# Patient Record
Sex: MALE | Race: WHITE | Employment: FULL TIME | URBAN - METROPOLITAN AREA
[De-identification: names, ages, dates, MRNs, and addresses within clinical notes are randomized per-mention and may not be internally consistent; named-entity substitution may affect disease eponyms.]

---

## 2017-03-13 NOTE — TELEPHONE ENCOUNTER
From: Melinda Light  To: Siddhartha Arias NP  Sent: 3/11/2017 3:36 PM EST  Subject: Medication Renewal Request    Original authorizing provider: ROSE White would like a refill of the following medications:  fenofibrate (LOFIBRA) 160 mg tablet Siddhartha Arias NP]    Preferred pharmacy: [facility]:

## 2017-03-14 RX ORDER — FENOFIBRATE 160 MG/1
TABLET ORAL
Qty: 90 TAB | Refills: 0 | Status: SHIPPED | OUTPATIENT
Start: 2017-03-14 | End: 2017-03-28 | Stop reason: SDUPTHER

## 2017-05-15 ENCOUNTER — OFFICE VISIT (OUTPATIENT)
Dept: FAMILY MEDICINE CLINIC | Age: 42
End: 2017-05-15

## 2017-05-15 VITALS
BODY MASS INDEX: 29.35 KG/M2 | SYSTOLIC BLOOD PRESSURE: 128 MMHG | OXYGEN SATURATION: 98 % | HEIGHT: 70 IN | WEIGHT: 205 LBS | DIASTOLIC BLOOD PRESSURE: 74 MMHG | TEMPERATURE: 98 F | RESPIRATION RATE: 16 BRPM | HEART RATE: 56 BPM

## 2017-05-15 DIAGNOSIS — Z82.49 FH: HEART DISEASE: ICD-10-CM

## 2017-05-15 DIAGNOSIS — G47.00 INSOMNIA, UNSPECIFIED TYPE: ICD-10-CM

## 2017-05-15 DIAGNOSIS — F41.8 DEPRESSION WITH ANXIETY: ICD-10-CM

## 2017-05-15 DIAGNOSIS — E78.2 MIXED DYSLIPIDEMIA: Primary | ICD-10-CM

## 2017-05-15 RX ORDER — VORTIOXETINE 20 MG/1
TABLET, FILM COATED ORAL
Refills: 0 | COMMUNITY
Start: 2017-04-11

## 2017-05-15 NOTE — PROGRESS NOTES
HISTORY OF PRESENT ILLNESS  Mary Huerta is a 42 y.o. male. HPI  Follow up chronic health problems. Mixed hyperlipidemia. Taking prescribed medications. Following healthy diet and exercising regularly. Positive FH CAD. Depression with anxiety. Followed by psych. Stable on trintellix. Insomnia. Still having some problems with falling asleep and staying asleep at night. Medications managed by psych. Taking gabapentin and ambien with little sx relief. Past Medical History:   Diagnosis Date    Hypercholesterolemia      Patient Active Problem List   Diagnosis Code    Hypertriglyceridemia E78.1    FH: heart disease Z82.49    Insomnia G47.00    Mixed dyslipidemia E78.2    Depression with anxiety F41.8     Current Outpatient Prescriptions   Medication Sig    TRINTELLIX 20 mg tablet TK 1 T PO QAM    fenofibrate (LOFIBRA) 160 mg tablet TAKE 1 TABLET BY MOUTH DAILY. Office visit due.  atorvastatin (LIPITOR) 10 mg tablet TAKE 1 TABLET DAILY    ketoconazole (NIZORAL) 2 % shampoo Apply twice weekly.  gabapentin (NEURONTIN) 300 mg capsule TAKE 1 CAPSULE BY MOUTH EVERY NIGHT AT BEDTIME    HYPODERMIC NEEDLES 18 gauge x 1 1/2\" ndle USE AS DIRECTED    BD LUER-JONATAN SYRINGE 3 mL 23 gauge x 1 1/2\" syrg USE AS DIRECTED    testosterone cypionate (DEPOTESTOTERONE CYPIONATE) 200 mg/mL injection INJECT 0.5 ML ITRAMUSCULARLY WEEKLY    ketoconazole (NIZORAL) 2 % topical cream Apply  to affected area two (2) times a day.  zolpidem (AMBIEN) 5 mg tablet Take 1 Tab by mouth nightly as needed.  desonide (DESOWEN) 0.05 % topical ointment Apply  to affected area two (2) times a day. No current facility-administered medications for this visit.       Social History   Substance Use Topics    Smoking status: Never Smoker    Smokeless tobacco: Never Used    Alcohol use 1.5 oz/week     3 Cans of beer per week      Comment: socially     Visit Vitals    /74 (BP 1 Location: Right arm, BP Patient Position: Sitting)    Pulse (!) 56    Temp 98 °F (36.7 °C) (Oral)    Resp 16    Ht 5' 10\" (1.778 m)    Wt 205 lb (93 kg)    SpO2 98%    BMI 29.41 kg/m2     Review of Systems   Constitutional: Negative. Eyes: Negative. Respiratory: Negative for cough and shortness of breath. Cardiovascular: Negative for chest pain, palpitations and leg swelling. Neurological: Negative for headaches. Psychiatric/Behavioral: Positive for depression (stable ). Negative for hallucinations, substance abuse and suicidal ideas. The patient has insomnia. The patient is not nervous/anxious. All other systems reviewed and are negative. Physical Exam   Constitutional: He appears well-developed and well-nourished. No distress. Neck: Neck supple. Cardiovascular: Normal rate, regular rhythm and normal heart sounds. Pulmonary/Chest: Effort normal and breath sounds normal.   Abdominal: Soft. He exhibits no distension. There is no tenderness. There is no guarding. Musculoskeletal: He exhibits no edema. Lymphadenopathy:     He has no cervical adenopathy. Neurological: He is alert. Skin: Skin is warm and dry. Psychiatric: He has a normal mood and affect. ASSESSMENT and PLAN  Nishi Nance was seen today for cholesterol problem and depression. Diagnoses and all orders for this visit:    Mixed dyslipidemia  -     TX HANDLG&/OR CONVEY OF SPEC FOR TR OFFICE TO LAB  -     TX COLLECTION VENOUS BLOOD,VENIPUNCTURE  -     LIPID PANEL  -     METABOLIC PANEL, COMPREHENSIVE  Reviewed healthy, AHA diet and exercise recommendations. Check fasting labs. FH: heart disease    Depression with anxiety  Symptoms stable on current treatment. Follow up as scheduled with psych. Insomnia, unspecified type  Sub optimal sx control. Recommend discuss with psych at next office visit. Follow up 6 months or sooner prn.

## 2017-05-15 NOTE — PROGRESS NOTES
Chief Complaint   Patient presents with    Cholesterol Problem     fasting     Depression       Reviewed Record in preparation for visit and have obtained necessary documentation. Identified pt with two pt identifiers (Name @ )    Health Maintenance Due   Topic    DTaP/Tdap/Td series (1 - Tdap)         1. Have you been to the ER, urgent care clinic since your last visit? Hospitalized since your last visit? No    2. Have you seen or consulted any other health care providers outside of the [address] [place] [address] since your last visit? Include any pap smears or colon screening. Saw [facility]. Recently.

## 2017-05-15 NOTE — MR AVS SNAPSHOT
Visit Information Date & Time Provider Department Dept. Phone Encounter #  
 5/15/2017  8:15 AM Dawson Alba, [address] [place] [address] [phone] [id number] Upcoming Health Maintenance Date Due DTaP/Tdap/Td series (1 - Tdap) 8/26/1995 INFLUENZA AGE 9 TO ADULT 8/1/2017 Allergies as of 5/15/2017  Review Complete On: 5/15/2017 By: Dawson Alba NP No Known Allergies Current Immunizations  Reviewed on 9/22/2016 Name Date Influenza Vaccine 9/20/2016, 9/17/2014 Not reviewed this visit You Were Diagnosed With   
  
 Codes Comments Mixed dyslipidemia    -  Primary ICD-10-CM: I35.4 ICD-9-CM: 272.2 FH: heart disease     ICD-10-CM: Z82.49 
ICD-9-CM: V17.49 Depression with anxiety     ICD-10-CM: F41.8 ICD-9-CM: 300.4 Insomnia, unspecified type     ICD-10-CM: G47.00 ICD-9-CM: 780.52 Vitals BP Pulse Temp Resp Height(growth percentile) Weight(growth percentile) 128/74 (BP 1 Location: Right arm, BP Patient Position: Sitting) (!) 56 98 °F (36.7 °C) (Oral) 16 5' 10\" (1.778 m) 205 lb (93 kg) SpO2 BMI Smoking Status 98% 29.41 kg/m2 Never Smoker Vitals History BMI and BSA Data Body Mass Index Body Surface Area  
 29.41 kg/m 2 2.14 m 2 Preferred Pharmacy Pharmacy Name Phone [facility], [facility] [phone] Your Updated Medication List  
  
   
This list is accurate as of: 5/15/17  8:43 AM.  Always use your most recent med list.  
  
  
  
  
 atorvastatin 10 mg tablet Commonly known as:  LIPITOR  
TAKE 1 TABLET DAILY  
  
 BD LUER-JONATAN SYRINGE 3 mL 23 gauge x 1 1/2\" Syrg Generic drug:  Syringe with Needle (Disp) USE AS DIRECTED  
  
 desonide 0.05 % topical ointment Commonly known as:  Nicholette Galindo Apply  to affected area two (2) times a day. fenofibrate 160 mg tablet Commonly known as:  LOFIBRA TAKE 1 TABLET BY MOUTH DAILY. Office visit due.  
  
 gabapentin 300 mg capsule Commonly known as:  NEURONTIN  
TAKE 1 CAPSULE BY MOUTH EVERY NIGHT AT BEDTIME  
  
 HYPODERMIC NEEDLES 18 gauge x 1 1/2\" Ndle Generic drug:  Needle (Disp) 18 G  
USE AS DIRECTED * ketoconazole 2 % topical cream  
Commonly known as:  NIZORAL Apply  to affected area two (2) times a day. * ketoconazole 2 % shampoo Commonly known as:  NIZORAL Apply twice weekly. testosterone cypionate 200 mg/mL injection Commonly known as:  DEPOTESTOTERONE CYPIONATE INJECT 0.5 ML ITRAMUSCULARLY WEEKLY  
  
 TRINTELLIX 20 mg tablet Generic drug:  vortioxetine TK 1 T PO QAM  
  
 zolpidem 5 mg tablet Commonly known as:  AMBIEN Take 1 Tab by mouth nightly as needed. * Notice: This list has 2 medication(s) that are the same as other medications prescribed for you. Read the directions carefully, and ask your doctor or other care provider to review them with you. We Performed the Following LIPID PANEL [85635 CPT(R)] METABOLIC PANEL, COMPREHENSIVE [91909 CPT(R)] SC COLLECTION VENOUS BLOOD,VENIPUNCTURE G5635232 CPT(R)] SC HANDLG&/OR CONVEY OF SPEC FOR TR OFFICE TO LAB [60437 CPT(R)] Introducing \[facility]\"" & HEALTH SERVICES! Dear Kole Woods: Thank you for requesting a [employer] account. Our records indicate that you already have an active [employer] account. You can access your account anytime at [email or web address] Did you know that you can access your hospital and ER discharge instructions at any time in [employer]? You can also review all of your test results from your hospital stay or ER visit. Additional Information If you have questions, please visit the Frequently Asked Questions section of the [employer] website at [email or web address] Remember, [employer] is NOT to be used for urgent needs. For medical emergencies, dial 911. Now available from your iPhone and Android! Please provide this summary of care documentation to your next provider. Your primary care clinician is listed as Andrew Plan. If you have any questions after today's visit, please call [phone].

## 2017-05-16 LAB
ALBUMIN SERPL-MCNC: 4.7 G/DL (ref 3.5–5.5)
ALBUMIN/GLOB SERPL: 2 {RATIO} (ref 1.2–2.2)
ALP SERPL-CCNC: 50 IU/L (ref 39–117)
ALT SERPL-CCNC: 28 IU/L (ref 0–44)
AST SERPL-CCNC: 59 IU/L (ref 0–40)
BILIRUB SERPL-MCNC: 0.5 MG/DL (ref 0–1.2)
BUN SERPL-MCNC: 14 MG/DL (ref 6–24)
BUN/CREAT SERPL: 12 (ref 9–20)
CALCIUM SERPL-MCNC: 9.7 MG/DL (ref 8.7–10.2)
CHLORIDE SERPL-SCNC: 104 MMOL/L (ref 96–106)
CHOLEST SERPL-MCNC: 187 MG/DL (ref 100–199)
CO2 SERPL-SCNC: 22 MMOL/L (ref 18–29)
CREAT SERPL-MCNC: 1.18 MG/DL (ref 0.76–1.27)
GLOBULIN SER CALC-MCNC: 2.3 G/DL (ref 1.5–4.5)
GLUCOSE SERPL-MCNC: 91 MG/DL (ref 65–99)
HDLC SERPL-MCNC: 57 MG/DL
INTERPRETATION, 910389: NORMAL
LDLC SERPL CALC-MCNC: 108 MG/DL (ref 0–99)
POTASSIUM SERPL-SCNC: 4.6 MMOL/L (ref 3.5–5.2)
PROT SERPL-MCNC: 7 G/DL (ref 6–8.5)
SODIUM SERPL-SCNC: 142 MMOL/L (ref 134–144)
TRIGL SERPL-MCNC: 111 MG/DL (ref 0–149)
VLDLC SERPL CALC-MCNC: 22 MG/DL (ref 5–40)

## 2017-05-22 DIAGNOSIS — F51.01 PRIMARY INSOMNIA: Primary | ICD-10-CM

## 2017-05-22 RX ORDER — ESZOPICLONE 2 MG/1
2 TABLET, FILM COATED ORAL
Qty: 30 TAB | Refills: 2 | Status: SHIPPED | OUTPATIENT
Start: 2017-05-22 | End: 2017-06-08 | Stop reason: ALTCHOICE

## 2017-06-08 DIAGNOSIS — G47.00 INSOMNIA, UNSPECIFIED TYPE: Primary | ICD-10-CM

## 2017-06-08 RX ORDER — ZOLPIDEM TARTRATE 10 MG/1
10 TABLET ORAL
Qty: 30 TAB | Refills: 2 | Status: SHIPPED | OUTPATIENT
Start: 2017-06-08 | End: 2017-08-14 | Stop reason: SDUPTHER

## 2017-08-12 DIAGNOSIS — G47.00 INSOMNIA, UNSPECIFIED TYPE: ICD-10-CM

## 2017-08-14 RX ORDER — ZOLPIDEM TARTRATE 10 MG/1
10 TABLET ORAL
Qty: 30 TAB | Refills: 2 | Status: SHIPPED | OUTPATIENT
Start: 2017-08-14 | End: 2017-11-12 | Stop reason: SDUPTHER

## 2017-08-14 NOTE — TELEPHONE ENCOUNTER
From: Pastor Shelley  To: Silvia Reed NP  Sent: 8/12/2017 11:13 AM EDT  Subject: Medication Renewal Request    Original authorizing provider: ROSE Paredes. Matt Restrepo would like a refill of the following medications:  zolpidem (AMBIEN) 10 mg tablet Silvia Reed NP]    Preferred pharmacy: Other - [facility] on [address]:  Could you send some refills for Ambien to the [facility] pharmacy at [address] [place] [address]? My prescriptions were at [facility] and I had to switch to [facility] because of my new insurance and now [facility] cannot get [facility] to transfer it there. Thank you.

## 2017-10-09 RX ORDER — FENOFIBRATE 160 MG/1
160 TABLET ORAL DAILY
Qty: 90 TAB | Refills: 1 | Status: SHIPPED | OUTPATIENT
Start: 2017-10-09 | End: 2018-05-06 | Stop reason: SDUPTHER

## 2017-10-09 NOTE — TELEPHONE ENCOUNTER
From: Adrian Baez  To: Balwinder Mena NP  Sent: 10/8/2017 6:29 PM EDT  Subject: Medication Renewal Request    Original authorizing provider: ROSE Vargas would like a refill of the following medications:  fenofibrate (LOFIBRA) 160 mg tablet [Venessa Wallace NP]    Preferred pharmacy: [address] [place] [address]    Comment:

## 2017-11-12 DIAGNOSIS — G47.00 INSOMNIA, UNSPECIFIED TYPE: ICD-10-CM

## 2017-11-13 RX ORDER — ZOLPIDEM TARTRATE 10 MG/1
10 TABLET ORAL
Qty: 30 TAB | Refills: 2 | Status: SHIPPED | OUTPATIENT
Start: 2017-11-13 | End: 2018-02-12 | Stop reason: SDUPTHER

## 2017-11-13 NOTE — TELEPHONE ENCOUNTER
From: Tello Mckeon  To: Simona Gitelman, NP  Sent: 11/12/2017 8:12 AM EST  Subject: Medication Renewal Request    Original authorizing provider: Simona Gitelman, NP Karma Hint.  Cristine Nuñez would like a refill of the following medications:  zolpidem (AMBIEN) 10 mg tablet [Venessa Wallace NP]    Preferred pharmacy: [facility]ACY #[id number], [address]:

## 2017-12-24 DIAGNOSIS — E78.2 MIXED DYSLIPIDEMIA: ICD-10-CM

## 2017-12-26 RX ORDER — ATORVASTATIN CALCIUM 10 MG/1
10 TABLET, FILM COATED ORAL DAILY
Qty: 90 TAB | Refills: 0 | Status: SHIPPED | OUTPATIENT
Start: 2017-12-26 | End: 2018-04-21 | Stop reason: SDUPTHER

## 2017-12-26 NOTE — TELEPHONE ENCOUNTER
From: Freddy Strickland  To: Montez Toledo NP  Sent: 12/24/2017 5:00 PM EST  Subject: Medication Renewal Request    Original authorizing provider: ROSE Alcantar.  Sarah Ariasterrie would like a refill of the following medications:  atorvastatin (LIPITOR) 10 mg tablet [Venessa Wallace NP]    Preferred pharmacy: [facility]/PHARMACY [address] [place] [address], [address]:

## 2018-02-19 ENCOUNTER — OFFICE VISIT (OUTPATIENT)
Dept: FAMILY MEDICINE CLINIC | Age: 43
End: 2018-02-19

## 2018-02-19 VITALS
OXYGEN SATURATION: 98 % | WEIGHT: 197 LBS | RESPIRATION RATE: 18 BRPM | TEMPERATURE: 98.4 F | SYSTOLIC BLOOD PRESSURE: 118 MMHG | DIASTOLIC BLOOD PRESSURE: 72 MMHG | HEART RATE: 65 BPM | HEIGHT: 70 IN | BODY MASS INDEX: 28.2 KG/M2

## 2018-02-19 DIAGNOSIS — F41.8 DEPRESSION WITH ANXIETY: ICD-10-CM

## 2018-02-19 DIAGNOSIS — E78.2 MIXED DYSLIPIDEMIA: Primary | ICD-10-CM

## 2018-02-19 DIAGNOSIS — F51.04 CHRONIC INSOMNIA: ICD-10-CM

## 2018-02-19 NOTE — PROGRESS NOTES
HISTORY OF PRESENT ILLNESS  Danielle Chávez is a 36 y.o. male. HPI  Follow up chronic health problems. Mixed hyperlipidemia. Taking prescribed meds. Following healthy diet and exercising regularly. Chronic insomnia. Stable on ambien. Has tried alternative therapy without success. Depression with anxiety. Followed by psych. Stable on meds. Past Medical History:   Diagnosis Date    Hypercholesterolemia      Patient Active Problem List   Diagnosis Code    Insomnia G47.00    Mixed dyslipidemia E78.2    Depression with anxiety F41.8     Current Outpatient Prescriptions   Medication Sig    zolpidem (AMBIEN) 10 mg tablet Take 1 Tab by mouth nightly as needed for Sleep. Max Daily Amount: 10 mg. Appointment due.  atorvastatin (LIPITOR) 10 mg tablet Take 1 Tab by mouth daily. Appointment due.  fenofibrate (LOFIBRA) 160 mg tablet Take 1 Tab by mouth daily.  TRINTELLIX 20 mg tablet TK 1 T PO QAM    gabapentin (NEURONTIN) 300 mg capsule TAKE 1 CAPSULE BY MOUTH EVERY NIGHT AT BEDTIME     No current facility-administered medications for this visit. Social History   Substance Use Topics    Smoking status: Never Smoker    Smokeless tobacco: Never Used    Alcohol use 1.5 oz/week     3 Cans of beer per week      Comment: socially     Visit Vitals    /72 (BP 1 Location: Left arm, BP Patient Position: Sitting)    Pulse 65    Temp 98.4 °F (36.9 °C) (Oral)    Resp 18    Ht 5' 10\" (1.778 m)    Wt 197 lb (89.4 kg)    SpO2 98%    BMI 28.27 kg/m2     Review of Systems   Constitutional: Negative. Respiratory: Negative for cough and shortness of breath. Cardiovascular: Negative for chest pain, palpitations and leg swelling. Psychiatric/Behavioral: Positive for depression (stable on meds). Negative for substance abuse and suicidal ideas. The patient has insomnia. The patient is not nervous/anxious. All other systems reviewed and are negative.       Physical Exam   Constitutional: No distress. Neck: Neck supple. Cardiovascular: Normal rate, regular rhythm and normal heart sounds. Pulmonary/Chest: Effort normal and breath sounds normal.   Abdominal: Soft. He exhibits no distension. There is no tenderness. There is no guarding. Musculoskeletal: He exhibits no edema. Lymphadenopathy:     He has no cervical adenopathy. Neurological: He is alert. Skin: Skin is warm and dry. Psychiatric: He has a normal mood and affect. His behavior is normal. Thought content normal.       ASSESSMENT and PLAN  Diagnoses and all orders for this visit:    1. Mixed dyslipidemia  -     LIPID PANEL  -     METABOLIC PANEL, COMPREHENSIVE  -     [place] HANDLG&/OR CONVEY OF SPEC FOR TR OFFICE TO LAB  -     COLLECTION VENOUS BLOOD,VENIPUNCTURE  Reviewed healthy diet and exercise recommendations. Fasting labs sent. 2. Depression with anxiety  Stable on current meds. Follow up as scheduled with psych. 3. Chronic insomnia  Stable on ambien. Medication risks for dependence,benefits and potential side effects were reviewed. Discussed trying belsomra. He will consider. Declined at this time. 4. BMI 28.0-28.9,adult  Weight loss recommendations given. Follow up 6 months or sooner prn.

## 2018-02-19 NOTE — MR AVS SNAPSHOT
[address] [place] [address] 
[phone] Patient: Emily Finn MRN: [id number] :[id number] Visit Information Date & Time Provider Department Dept. Phone Encounter #  
 2018  8:15 AM John Moon, [address] [place] [address] [phone] [id number] Upcoming Health Maintenance Date Due DTaP/Tdap/Td series (2 - Td) 5/15/2027 Allergies as of 2018  Review Complete On: 2018 By: John Moon NP No Known Allergies Current Immunizations  Reviewed on 2016 Name Date Influenza Vaccine 10/19/2017, 2016, 2014 Not reviewed this visit You Were Diagnosed With   
  
 Codes Comments Mixed dyslipidemia    -  Primary ICD-10-CM: O19.6 ICD-9-CM: 272.2 Depression with anxiety     ICD-10-CM: F41.8 ICD-9-CM: 300.4 Chronic insomnia     ICD-10-CM: F51.04 
ICD-9-CM: 780.52 Vitals BP Pulse Temp Resp Height(growth percentile) Weight(growth percentile) 118/72 (BP 1 Location: Left arm, BP Patient Position: Sitting) 65 98.4 °F (36.9 °C) (Oral) 18 5' 10\" (1.778 m) 197 lb (89.4 kg) SpO2 BMI Smoking Status 98% 28.27 kg/m2 Never Smoker BMI and BSA Data Body Mass Index Body Surface Area  
 28.27 kg/m 2 2.1 m 2 Preferred Pharmacy Pharmacy Name Phone [id number] [place], [address] [facility] [phone] Your Updated Medication List  
  
   
This list is accurate as of: 18  8:51 AM.  Always use your most recent med list.  
  
  
  
  
 atorvastatin 10 mg tablet Commonly known as:  LIPITOR Take 1 Tab by mouth daily. Appointment due. fenofibrate 160 mg tablet Commonly known as:  LOFIBRA Take 1 Tab by mouth daily. gabapentin 300 mg capsule Commonly known as:  NEURONTIN  
TAKE 1 CAPSULE BY MOUTH EVERY NIGHT AT BEDTIME  
  
 TRINTELLIX 20 mg tablet Generic drug:  vortioxetine TK 1 T PO QAM  
  
 zolpidem 10 mg tablet Commonly known as:  AMBIEN Take 1 Tab by mouth nightly as needed for Sleep. Max Daily Amount: 10 mg. Appointment due. We Performed the Following COLLECTION VENOUS BLOOD,VENIPUNCTURE [id number] CPT(R)] LIPID PANEL [65756 CPT(R)] METABOLIC PANEL, COMPREHENSIVE [71318 CPT(R)] [place] HANDLG&/OR CONVEY OF SPEC FOR TR OFFICE TO LAB [41652 CPT(R)] Introducing [facility] SERVICES! Dear Jaison Carey: Thank you for requesting a [employer] account. Our records indicate that you already have an active [employer] account. You can access your account anytime at [email or web address] Did you know that you can access your hospital and ER discharge instructions at any time in [employer]? You can also review all of your test results from your hospital stay or ER visit. Additional Information If you have questions, please visit the Frequently Asked Questions section of the [employer] website at [email or web address] Remember, [employer] is NOT to be used for urgent needs. For medical emergencies, dial 911. Now available from your iPhone and Android! Please provide this summary of care documentation to your next provider. Your primary care clinician is listed as Sven Wong. If you have any questions after today's visit, please call [phone].

## 2018-02-19 NOTE — PROGRESS NOTES
Chief Complaint   Patient presents with    Medication Evaluation    Cholesterol Problem     1. Have you been to the ER, urgent care clinic since your last visit? Hospitalized since your last visit? No    2. Have you seen or consulted any other health care providers outside of the [address] [place] [address] since your last visit? Include any pap smears or colon screening.  No

## 2018-02-20 LAB
ALBUMIN SERPL-MCNC: 4.7 G/DL (ref 3.5–5.5)
ALBUMIN/GLOB SERPL: 1.9 {RATIO} (ref 1.2–2.2)
ALP SERPL-CCNC: 45 IU/L (ref 39–117)
ALT SERPL-CCNC: 21 IU/L (ref 0–44)
AST SERPL-CCNC: 20 IU/L (ref 0–40)
BILIRUB SERPL-MCNC: 0.3 MG/DL (ref 0–1.2)
BUN SERPL-MCNC: 22 MG/DL (ref 6–24)
BUN/CREAT SERPL: 15 (ref 9–20)
CALCIUM SERPL-MCNC: 9.7 MG/DL (ref 8.7–10.2)
CHLORIDE SERPL-SCNC: 104 MMOL/L (ref 96–106)
CHOLEST SERPL-MCNC: 188 MG/DL (ref 100–199)
CO2 SERPL-SCNC: 22 MMOL/L (ref 18–29)
CREAT SERPL-MCNC: 1.49 MG/DL (ref 0.76–1.27)
GFR SERPLBLD CREATININE-BSD FMLA CKD-EPI: 57 ML/MIN/1.73
GFR SERPLBLD CREATININE-BSD FMLA CKD-EPI: 66 ML/MIN/1.73
GLOBULIN SER CALC-MCNC: 2.5 G/DL (ref 1.5–4.5)
GLUCOSE SERPL-MCNC: 93 MG/DL (ref 65–99)
HDLC SERPL-MCNC: 43 MG/DL
INTERPRETATION, 910389: NORMAL
INTERPRETATION: NORMAL
LDLC SERPL CALC-MCNC: 124 MG/DL (ref 0–99)
PDF IMAGE, 910387: NORMAL
POTASSIUM SERPL-SCNC: 4.6 MMOL/L (ref 3.5–5.2)
PROT SERPL-MCNC: 7.2 G/DL (ref 6–8.5)
SODIUM SERPL-SCNC: 143 MMOL/L (ref 134–144)
TRIGL SERPL-MCNC: 106 MG/DL (ref 0–149)
VLDLC SERPL CALC-MCNC: 21 MG/DL (ref 5–40)

## 2018-02-26 DIAGNOSIS — F51.04 CHRONIC INSOMNIA: Primary | ICD-10-CM

## 2018-03-05 DIAGNOSIS — F51.04 CHRONIC INSOMNIA: Primary | ICD-10-CM

## 2018-03-15 DIAGNOSIS — F51.04 CHRONIC INSOMNIA: Primary | ICD-10-CM

## 2018-03-15 RX ORDER — ZOLPIDEM TARTRATE 10 MG/1
10 TABLET ORAL
Qty: 30 TAB | Refills: 3 | Status: SHIPPED | OUTPATIENT
Start: 2018-03-15 | End: 2018-09-04 | Stop reason: SDUPTHER

## 2018-05-07 RX ORDER — FENOFIBRATE 160 MG/1
TABLET ORAL
Qty: 90 TAB | Refills: 0 | Status: SHIPPED | OUTPATIENT
Start: 2018-05-07 | End: 2018-07-16 | Stop reason: SDUPTHER

## 2018-07-17 NOTE — TELEPHONE ENCOUNTER
From: Nasim Barahona  To: Judah Silverman NP  Sent: 7/16/2018 10:05 PM EDT  Subject: Medication Renewal Request    Original authorizing provider: ROSE Alex.  Vita Holstein would like a refill of the following medications:  fenofibrate (LOFIBRA) 160 mg tablet [Venessa Wallace NP]    Preferred pharmacy: [facility]/PHARMACY #[id number] [place] [address], [address]:

## 2018-07-18 RX ORDER — FENOFIBRATE 160 MG/1
160 TABLET ORAL DAILY
Qty: 90 TAB | Refills: 0 | Status: SHIPPED | OUTPATIENT
Start: 2018-07-18 | End: 2019-01-16 | Stop reason: SDUPTHER

## 2018-09-06 ENCOUNTER — TELEPHONE (OUTPATIENT)
Dept: FAMILY MEDICINE CLINIC | Age: 43
End: 2018-09-06

## 2018-09-17 ENCOUNTER — OFFICE VISIT (OUTPATIENT)
Dept: FAMILY MEDICINE CLINIC | Age: 43
End: 2018-09-17

## 2018-09-17 VITALS
HEART RATE: 60 BPM | OXYGEN SATURATION: 99 % | SYSTOLIC BLOOD PRESSURE: 119 MMHG | BODY MASS INDEX: 28.89 KG/M2 | TEMPERATURE: 97.8 F | HEIGHT: 70 IN | RESPIRATION RATE: 16 BRPM | DIASTOLIC BLOOD PRESSURE: 76 MMHG | WEIGHT: 201.8 LBS

## 2018-09-17 DIAGNOSIS — F51.04 CHRONIC INSOMNIA: ICD-10-CM

## 2018-09-17 DIAGNOSIS — F41.8 DEPRESSION WITH ANXIETY: ICD-10-CM

## 2018-09-17 DIAGNOSIS — E78.2 MIXED DYSLIPIDEMIA: Primary | ICD-10-CM

## 2018-09-17 NOTE — PROGRESS NOTES
HISTORY OF PRESENT ILLNESS Bruce Collins is a 39 y.o. male. HPI Six month follow up chronic health problems. Mixed dyslipidemia. Taking prescribed meds. Following healthy diet and exercising regularly. FH positive for CAD, father. Chronic insomnia. Taking ambien nightly. Reports he cannot sleep without medication. Has tried alternative meds in the past without sx relief. Depression/anxiety. Followed by psych. Taking meds as prescribed. Past Medical History:  
Diagnosis Date  Hypercholesterolemia Patient Active Problem List  
Diagnosis Code  Mixed dyslipidemia E78.2  Depression with anxiety F41.8  Chronic insomnia F51.04  
 
Current Outpatient Prescriptions Medication Sig  
 zolpidem (AMBIEN) 10 mg tablet Take 1 Tab by mouth nightly as needed for Sleep. Max Daily Amount: 10 mg.  
 fenofibrate (LOFIBRA) 160 mg tablet Take 1 Tab by mouth daily. Appointment due.  atorvastatin (LIPITOR) 10 mg tablet Take 1 Tab by mouth daily.  TRINTELLIX 20 mg tablet TK 1 T PO QAM  
 
No current facility-administered medications for this visit. Social History Substance Use Topics  Smoking status: Never Smoker  Smokeless tobacco: Never Used  Alcohol use 1.5 oz/week 3 Cans of beer per week Comment: socially Visit Vitals  /76 (BP 1 Location: Right arm, BP Patient Position: Sitting)  Pulse 60  Temp 97.8 °F (36.6 °C) (Oral)  Resp 16  
 Ht 5' 10\" (1.778 m)  Wt 201 lb 12.8 oz (91.5 kg)  SpO2 99%  BMI 28.96 kg/m2 Review of Systems Constitutional: Negative. Respiratory: Negative for cough and shortness of breath. Cardiovascular: Negative for chest pain, palpitations and leg swelling. Psychiatric/Behavioral: Positive for depression (stable). Negative for hallucinations, substance abuse and suicidal ideas. The patient has insomnia. The patient is not nervous/anxious. All other systems reviewed and are negative. Physical Exam  
Constitutional: He appears well-developed and well-nourished. No distress. Neck: Neck supple. Cardiovascular: Normal rate, regular rhythm and normal heart sounds. Pulmonary/Chest: Effort normal and breath sounds normal.  
Abdominal: Soft. He exhibits no distension. There is no tenderness. There is no guarding. Musculoskeletal: Normal range of motion. He exhibits no edema. Lymphadenopathy:  
  He has no cervical adenopathy. Neurological: He is alert. Coordination normal.  
Skin: Skin is warm and dry. Psychiatric: He has a normal mood and affect. His behavior is normal. Thought content normal.  
 
 
ASSESSMENT and PLAN Diagnoses and all orders for this visit: 
 
1. Mixed dyslipidemia -     LIPID PANEL 
-     METABOLIC PANEL, COMPREHENSIVE 
-     MD HANDLG&/OR CONVEY OF SPEC FOR TR OFFICE TO LAB 
-     COLLECTION VENOUS BLOOD,VENIPUNCTURE He will return for fasting labs. Reviewed healthy, AHA diet and exercise recommendations. Recommend adding daily low dose ASA. 2. Depression with anxiety Stable on current treatment. Follow up as scheduled with psych. 3. Chronic insomnia Stable on ambien. Medication risks, benefits and potential side effects were reviewed. 4. BMI 28.0-28.9,adult Reviewed healthy diet and exercise recommendations.

## 2018-09-17 NOTE — PROGRESS NOTES
Freddy Strickland is a 39 y.o. male Chief Complaint Patient presents with  Cholesterol Problem  
  mixed dyslipidemia- 6 month f/u visit.  Depression  Insomnia 1. Have you been to the ER, urgent care clinic since your last visit? Hospitalized since your last visit? No 
 
2. Have you seen or consulted any other health care providers outside of the [address] [place] [address] since your last visit? Include any pap smears or colon screening. No  
 
There are no preventive care reminders to display for this patient. Visit Vitals  /76 (BP 1 Location: Right arm, BP Patient Position: Sitting)  Pulse 60  Temp 97.8 °F (36.6 °C) (Oral)  Resp 16  
 Ht 5' 10\" (1.778 m)  Wt 201 lb 12.8 oz (91.5 kg)  SpO2 99%  BMI 28.96 kg/m2 Kelley Cornejo LPN

## 2018-09-23 LAB
ALBUMIN SERPL-MCNC: 4.9 G/DL (ref 3.5–5.5)
ALBUMIN/GLOB SERPL: 2.3 {RATIO} (ref 1.2–2.2)
ALP SERPL-CCNC: 44 IU/L (ref 39–117)
ALT SERPL-CCNC: 22 IU/L (ref 0–44)
AST SERPL-CCNC: 25 IU/L (ref 0–40)
BILIRUB SERPL-MCNC: 0.5 MG/DL (ref 0–1.2)
BUN SERPL-MCNC: 16 MG/DL (ref 6–24)
BUN/CREAT SERPL: 12 (ref 9–20)
CALCIUM SERPL-MCNC: 9.7 MG/DL (ref 8.7–10.2)
CHLORIDE SERPL-SCNC: 103 MMOL/L (ref 96–106)
CHOLEST SERPL-MCNC: 182 MG/DL (ref 100–199)
CO2 SERPL-SCNC: 24 MMOL/L (ref 20–29)
CREAT SERPL-MCNC: 1.34 MG/DL (ref 0.76–1.27)
GLOBULIN SER CALC-MCNC: 2.1 G/DL (ref 1.5–4.5)
GLUCOSE SERPL-MCNC: 77 MG/DL (ref 65–99)
HDLC SERPL-MCNC: 50 MG/DL
INTERPRETATION, 910389: NORMAL
LDLC SERPL CALC-MCNC: 111 MG/DL (ref 0–99)
POTASSIUM SERPL-SCNC: 4.7 MMOL/L (ref 3.5–5.2)
PROT SERPL-MCNC: 7 G/DL (ref 6–8.5)
SODIUM SERPL-SCNC: 142 MMOL/L (ref 134–144)
TRIGL SERPL-MCNC: 105 MG/DL (ref 0–149)
VLDLC SERPL CALC-MCNC: 21 MG/DL (ref 5–40)

## 2018-09-24 DIAGNOSIS — R68.82 DECREASED LIBIDO: Primary | ICD-10-CM

## 2018-09-26 LAB — TESTOST SERPL-MCNC: 320 NG/DL (ref 264–916)

## 2018-11-21 DIAGNOSIS — E78.2 MIXED DYSLIPIDEMIA: ICD-10-CM

## 2018-11-21 RX ORDER — ATORVASTATIN CALCIUM 10 MG/1
10 TABLET, FILM COATED ORAL DAILY
Qty: 90 TAB | Refills: 1 | Status: SHIPPED | OUTPATIENT
Start: 2018-11-21 | End: 2019-05-20 | Stop reason: SDUPTHER

## 2019-01-14 PROBLEM — F43.10 PTSD (POST-TRAUMATIC STRESS DISORDER): Status: ACTIVE | Noted: 2019-01-14

## 2019-01-17 RX ORDER — FENOFIBRATE 160 MG/1
160 TABLET ORAL DAILY
Qty: 90 TAB | Refills: 0 | Status: SHIPPED | OUTPATIENT
Start: 2019-01-17 | End: 2019-04-16 | Stop reason: SDUPTHER

## 2019-02-05 DIAGNOSIS — F51.04 CHRONIC INSOMNIA: ICD-10-CM

## 2019-02-07 RX ORDER — ZOLPIDEM TARTRATE 10 MG/1
10 TABLET ORAL
Qty: 30 TAB | Refills: 3 | Status: SHIPPED | OUTPATIENT
Start: 2019-02-07

## 2019-04-08 ENCOUNTER — TELEPHONE (OUTPATIENT)
Dept: FAMILY MEDICINE CLINIC | Age: 44
End: 2019-04-08

## 2019-04-08 NOTE — TELEPHONE ENCOUNTER
Regarding: RE: Referral Request  Contact: [phone]  ----- Message from [address] [place] [address] 951, [facility] sent at 4/8/2019  4:09 PM EDT -----    Medication renewal and ongoing psychiatric care.   ----- Message -----  From: Ottoniel Ontiveros LPN  Sent: 8/6/7642  4:03 PM EDT  To: Darryl Mayes  Subject: RE: Referral Request  One more question what is the reason you are seeing them      ----- Message -----     From: Darryl Mayes     Sent: 4/8/2019  4:00 PM EDT       To: Nevaeh Albarran NP  Subject: RE: Referral Request    Yes, I think that it is just a note to be seen. I think that they are a referral based clinic. The fax number is [phone].   ----- Message -----  From: Ottoniel Ontiveros LPN  Sent: 9/3/5816  3:51 PM EDT  To: Darryl Mayes  Subject: RE: Referral Request  Just a note to say that you need to be seen? We have never done this before which is why we are confused as to what you need. Where do I send it to?    ----- Message -----     From: Darryl Mayes     Sent: 4/8/2019  3:46 PM EDT       To: Nevaeh Albarran NP  Subject: RE: Referral Request    It is a psychiatry office so I think it is for being a new patient but not for insurance. ----- Message -----  From: Ottoniel Ontiveros LPN  Sent: 0/2/9581  2:57 PM EDT  To: Darryl Mayes  Subject: RE: Referral Request  What type of referral do they need? An insurance referral?    ----- Message -----     From: Darryl Mayes     Sent: 4/8/2019  2:44 PM EDT       To: Nevaeh Albarran NP  Subject: Referral Request    [place] recently moved [place] and the doctors office that I will be going to needs a referral to get care. The office name is [facility]. The number is [phone]. Thank you and please let me know if you have any questions.

## 2019-04-11 DIAGNOSIS — F41.8 DEPRESSION WITH ANXIETY: ICD-10-CM

## 2019-04-11 DIAGNOSIS — F43.10 PTSD (POST-TRAUMATIC STRESS DISORDER): Primary | ICD-10-CM

## 2019-05-20 DIAGNOSIS — E78.2 MIXED DYSLIPIDEMIA: ICD-10-CM

## 2019-05-20 RX ORDER — ATORVASTATIN CALCIUM 10 MG/1
TABLET, FILM COATED ORAL
Qty: 90 TAB | Refills: 1 | Status: SHIPPED | OUTPATIENT
Start: 2019-05-20